# Patient Record
Sex: FEMALE | Race: OTHER | Employment: UNEMPLOYED | URBAN - METROPOLITAN AREA
[De-identification: names, ages, dates, MRNs, and addresses within clinical notes are randomized per-mention and may not be internally consistent; named-entity substitution may affect disease eponyms.]

---

## 2019-11-05 ENCOUNTER — TELEPHONE (OUTPATIENT)
Dept: GASTROENTEROLOGY | Facility: CLINIC | Age: 54
End: 2019-11-05

## 2019-11-05 ENCOUNTER — APPOINTMENT (OUTPATIENT)
Dept: CT IMAGING | Facility: HOSPITAL | Age: 54
End: 2019-11-05
Attending: PHYSICIAN ASSISTANT

## 2019-11-05 ENCOUNTER — HOSPITAL ENCOUNTER (EMERGENCY)
Facility: HOSPITAL | Age: 54
Discharge: HOME | End: 2019-11-05
Attending: PHYSICIAN ASSISTANT

## 2019-11-05 VITALS
OXYGEN SATURATION: 97 % | RESPIRATION RATE: 18 BRPM | DIASTOLIC BLOOD PRESSURE: 71 MMHG | HEART RATE: 70 BPM | TEMPERATURE: 98 F | SYSTOLIC BLOOD PRESSURE: 114 MMHG

## 2019-11-05 DIAGNOSIS — R73.9 HYPERGLYCEMIA: ICD-10-CM

## 2019-11-05 DIAGNOSIS — D25.9 UTERINE LEIOMYOMA, UNSPECIFIED LOCATION: ICD-10-CM

## 2019-11-05 DIAGNOSIS — K86.89 MASS OF PANCREAS: ICD-10-CM

## 2019-11-05 DIAGNOSIS — K41.90 FEMORAL HERNIA OF RIGHT SIDE: ICD-10-CM

## 2019-11-05 DIAGNOSIS — R10.30 LOWER ABDOMINAL PAIN: Primary | ICD-10-CM

## 2019-11-05 DIAGNOSIS — K40.90 RIGHT INGUINAL HERNIA: ICD-10-CM

## 2019-11-05 PROCEDURE — 99285 EMERGENCY DEPT VISIT HI MDM: CPT

## 2019-11-05 PROCEDURE — 96374 THER/PROPH/DIAG INJ IV PUSH: CPT

## 2019-11-05 PROCEDURE — 85025 COMPLETE CBC W/AUTO DIFF WBC: CPT | Performed by: PHYSICIAN ASSISTANT

## 2019-11-05 PROCEDURE — 83690 ASSAY OF LIPASE: CPT | Performed by: PHYSICIAN ASSISTANT

## 2019-11-05 PROCEDURE — 81001 URINALYSIS AUTO W/SCOPE: CPT | Performed by: PHYSICIAN ASSISTANT

## 2019-11-05 PROCEDURE — 96361 HYDRATE IV INFUSION ADD-ON: CPT

## 2019-11-05 PROCEDURE — 74177 CT ABD & PELVIS W/CONTRAST: CPT | Performed by: PHYSICIAN ASSISTANT

## 2019-11-05 PROCEDURE — 80048 BASIC METABOLIC PNL TOTAL CA: CPT | Performed by: PHYSICIAN ASSISTANT

## 2019-11-05 PROCEDURE — 96375 TX/PRO/DX INJ NEW DRUG ADDON: CPT

## 2019-11-05 PROCEDURE — 80076 HEPATIC FUNCTION PANEL: CPT | Performed by: PHYSICIAN ASSISTANT

## 2019-11-05 RX ORDER — NAPROXEN 500 MG/1
500 TABLET ORAL 2 TIMES DAILY PRN
Qty: 14 TABLET | Refills: 0 | Status: SHIPPED | OUTPATIENT
Start: 2019-11-05 | End: 2019-11-12

## 2019-11-05 RX ORDER — ONDANSETRON 2 MG/ML
4 INJECTION INTRAMUSCULAR; INTRAVENOUS ONCE
Status: COMPLETED | OUTPATIENT
Start: 2019-11-05 | End: 2019-11-05

## 2019-11-05 RX ORDER — LISINOPRIL 10 MG/1
10 TABLET ORAL DAILY
COMMUNITY

## 2019-11-05 RX ORDER — MORPHINE SULFATE 4 MG/ML
2 INJECTION, SOLUTION INTRAMUSCULAR; INTRAVENOUS ONCE
Status: COMPLETED | OUTPATIENT
Start: 2019-11-05 | End: 2019-11-05

## 2019-11-05 RX ORDER — ATORVASTATIN CALCIUM 10 MG/1
10 TABLET, FILM COATED ORAL NIGHTLY
COMMUNITY

## 2019-11-05 NOTE — TELEPHONE ENCOUNTER
ON CALL NOTE:    Patient in ER with lower ab pain, possibly inguinal hernia related? Incidental finding of pancreatic mass, will need f/u. Lipase mildly elevated.      GI Clinical Staff:   Please call patient, I d/w Dr. Winnie Leon, he can see the patient thi

## 2019-11-05 NOTE — ED INITIAL ASSESSMENT (HPI)
Patient here with lower abdominal pain from the two hernias she has had for the last ten years. States they have been hurting for the last two months on and off, but are worse today.  Denies N/V/D

## 2019-11-05 NOTE — ED PROVIDER NOTES
Patient Seen in: [facility] Emergency Department    History   Patient presents with:  Pain (neurologic)    Stated Complaint: hernia pains    HPI    Tj Montes is a 53year old female who presents with chief complaint of bilateral lower abdominal limits on room air as interpreted by this provider. Physical Exam    Constitutional: The patient is cooperative. Appears well-developed and well-nourished. Mild discomfort. Psychological: Alert, No abnormalities of mood, affect.   Head: Normocephal CBC WITH DIFFERENTIAL WITH PLATELET    Narrative: The following orders were created for panel order CBC WITH DIFFERENTIAL WITH PLATELET.   Procedure                               Abnormality         Status                     --------- of pancreas  Uterine leiomyoma, unspecified location  Hyperglycemia  Right inguinal hernia  Femoral hernia of right side    Disposition:  Discharge    Follow-up:  Tanmay John MD  [address]  [address] [place] [address] [phone]    Schedule

## 2019-11-06 NOTE — TELEPHONE ENCOUNTER
GI Staff:    Please call the patient.  I can see her at 1 PM in [place] [facility] [place]    Thanks    Zachery Wiley MD  [facility], [facility] - Gastroenterology  11/5/2019  6:12 PM

## 2019-11-06 NOTE — TELEPHONE ENCOUNTER
# [id number]    Through , message from Dr. Debbi Sanchez given to patient. Patient can come in on Friday at 1 pm. Patient advised to come 15 minutes earlier. [facility]

## 2019-11-06 NOTE — ED NOTES
Discharge instructions translated via language line:Patient provided discharge instructions. Instructions reviewed with patient. Patient verbalized understanding. PA at bedside. All questions and concerns addressed,.

## 2019-11-07 PROBLEM — K40.90 LEFT INGUINAL HERNIA: Status: ACTIVE | Noted: 2019-11-07

## 2019-11-07 PROBLEM — I10 ESSENTIAL HYPERTENSION: Status: ACTIVE | Noted: 2019-11-07

## 2019-11-07 PROBLEM — K86.89 MASS OF PANCREAS: Status: ACTIVE | Noted: 2019-11-07

## 2019-11-07 PROBLEM — K41.90 FEMORAL HERNIA OF RIGHT SIDE: Status: ACTIVE | Noted: 2019-11-07

## 2019-11-07 PROBLEM — K40.90 RIGHT INGUINAL HERNIA: Status: ACTIVE | Noted: 2019-11-07

## 2019-11-07 PROBLEM — E78.00 HYPERCHOLESTEROLEMIA: Status: ACTIVE | Noted: 2019-11-07

## 2019-11-07 PROBLEM — E11.9 TYPE 2 DIABETES MELLITUS WITHOUT COMPLICATION, WITHOUT LONG-TERM CURRENT USE OF INSULIN (HCC): Status: ACTIVE | Noted: 2019-11-07

## 2019-11-08 ENCOUNTER — OFFICE VISIT (OUTPATIENT)
Dept: GASTROENTEROLOGY | Facility: CLINIC | Age: 54
End: 2019-11-08

## 2019-11-08 VITALS
HEART RATE: 96 BPM | BODY MASS INDEX: 25.61 KG/M2 | HEIGHT: 66 IN | SYSTOLIC BLOOD PRESSURE: 113 MMHG | DIASTOLIC BLOOD PRESSURE: 72 MMHG | WEIGHT: 159.38 LBS

## 2019-11-08 DIAGNOSIS — R93.5 ABNORMAL CT OF THE ABDOMEN: Primary | ICD-10-CM

## 2019-11-08 DIAGNOSIS — K86.9 LESION OF PANCREAS: ICD-10-CM

## 2019-11-08 PROCEDURE — 99204 OFFICE O/P NEW MOD 45 MIN: CPT | Performed by: INTERNAL MEDICINE

## 2019-11-08 NOTE — PATIENT INSTRUCTIONS
1. Call the radiology department to schedule your MRI    2. Dr. Clemencia Gonzáles ([phone]) or Dr. Harish Acuna ([phone]) are both excellent primary care physicians.  Please make an appointment

## 2019-11-08 NOTE — H&P
[facility], [facility] - Gastroenterology                                                                                                  Clinic History and Physical daily.     • naproxen 500 MG Oral Tab Take 1 tablet (500 mg total) by mouth 2 (two) times daily as needed.  14 tablet 0     Allergies:  No Known Allergies    ROS:   all 10 systems were reviewed and were negative except as noted in the HPI    PHYSICAL EXAM: immediately and prefers MRI/MRCP first and proceeding with EUS if necessary, which we discussed still maybe. We also discussed establishing care with a primary care physician.     Recommend:  -MRI/MRCP   -possible EGD/EUS with possible FNA pending above

## 2019-11-15 ENCOUNTER — TELEPHONE (OUTPATIENT)
Dept: GASTROENTEROLOGY | Facility: CLINIC | Age: 54
End: 2019-11-15

## 2019-11-15 NOTE — TELEPHONE ENCOUNTER
Per Dr. Gissel Leavitt note documentation 11/8/2019, pt was to f/u / [facility] financial assistance department and apply for help b/c she does not have insurance.      I spoke to pt ([place]) and she states the financial aid office informed her to provide a letter from

## 2019-12-17 ENCOUNTER — TELEPHONE (OUTPATIENT)
Dept: GASTROENTEROLOGY | Facility: CLINIC | Age: 54
End: 2019-12-17

## (undated) NOTE — ED AVS SNAPSHOT
Snehal Langley   MRN: [id number]    Department:  [facility] Emergency Department   Date of Visit:  11/5/2019           Disclosure     Insurance plans vary and the physician(s) referred by the ER may not be covered by your plan.  Please contact your CARE PHYSICIAN AT ONCE OR RETURN IMMEDIATELY TO THE EMERGENCY DEPARTMENT. If you have been prescribed any medication(s), please fill your prescription right away and begin taking the medication(s) as directed.   If you believe that any of the medications

## (undated) NOTE — LETTER
12/17/19        [facility], [facility]  [address] [facility] [address]      [address] Marbella Molina registros indican que tiene análisis clínicos pendientes y/o pruebas que se ordenaron en leung nombre que no se gregg comp